# Patient Record
Sex: FEMALE | Race: ASIAN
[De-identification: names, ages, dates, MRNs, and addresses within clinical notes are randomized per-mention and may not be internally consistent; named-entity substitution may affect disease eponyms.]

---

## 2020-05-22 ENCOUNTER — HOSPITAL ENCOUNTER (EMERGENCY)
Dept: HOSPITAL 7 - FB.ED | Age: 40
LOS: 1 days | Discharge: HOME | End: 2020-05-23
Payer: COMMERCIAL

## 2020-05-22 DIAGNOSIS — Z88.1: ICD-10-CM

## 2020-05-22 DIAGNOSIS — I10: Primary | ICD-10-CM

## 2020-05-22 PROCEDURE — 99283 EMERGENCY DEPT VISIT LOW MDM: CPT

## 2020-05-23 VITALS — SYSTOLIC BLOOD PRESSURE: 129 MMHG | DIASTOLIC BLOOD PRESSURE: 87 MMHG | HEART RATE: 85 BPM

## 2020-05-23 NOTE — EDM.PDOC
ED HPI GENERAL MEDICAL PROBLEM





- General


Chief Complaint: Headache


Stated Complaint: HIGH BLOOD PRESSURE


Time Seen by Provider: 05/22/20 23:40


Source of Information: Reports: Patient


History Limitations: Reports: No Limitations





- History of Present Illness


INITIAL COMMENTS - FREE TEXT/NARRATIVE: 





headache frontal since this am , took tylenol, did not improve,. Took BP and 

noted it was elevated to 150/90 


recheck at 6pm was still elevated 


on arrival her , noted to be elevated still


no chest pain , no palpitations, no sob 


has family history of HTN


Onset: Today


Duration: Getting Worse


Location: Reports: Head


Improves with: Reports: None


Worsens with: Reports: None


Context: Reports: Activity


Associated Symptoms: Reports: Headaches





- Related Data


 Allergies











Allergy/AdvReac Type Severity Reaction Status Date / Time


 


amoxicillin Allergy  Hives Verified 05/23/20 00:41











Home Meds: 


 Home Meds





.Vitamin C 1 tab PO ASDIRECTED 05/23/20 [History]











Past Medical History





- Past Health History


Medical/Surgical History: Denies Medical/Surgical History





Social & Family History





- Living Situation & Occupation


Living situation: Reports: Single


Occupation: Employed





ED ROS GENERAL





- Review of Systems


Review Of Systems: See Below


Constitutional: Reports: No Symptoms


HEENT: Reports: No Symptoms


Respiratory: Reports: No Symptoms


Cardiovascular: Reports: No Symptoms


Endocrine: Reports: No Symptoms


GI/Abdominal: Reports: No Symptoms


Musculoskeletal: Reports: No Symptoms


Skin: Reports: No Symptoms





ED EXAM, GENERAL





- Physical Exam


Exam: See Below


Exam Limited By: No Limitations


General Appearance: Alert, WD/WN, No Apparent Distress


Eye Exam: Bilateral Eye: EOMI


Ears: Normal External Exam


Nose: Normal Mucosa


Throat/Mouth: Normal Oropharynx


Head: Atraumatic, Normocephalic


Neck: Supple, Non-Tender


Respiratory/Chest: No Respiratory Distress, Lungs Clear, Normal Breath Sounds


Cardiovascular: Regular Rate, Rhythm


GI/Abdominal: Soft, Non-Tender


 (Female) Exam: Deferred


Extremities: Normal Inspection, Normal Range of Motion


Neurological: Alert, Oriented, CN II-XII Intact


Psychiatric: Normal Affect, Normal Mood


Skin Exam: Warm





Course





- Vital Signs


Last Recorded V/S: 


 Last Vital Signs











Temp      


 


Pulse      


 


Resp      


 


BP  181/109 H  05/22/20 23:58


 


Pulse Ox      














- Orders/Labs/Meds


Orders: 


 Active Orders 24 hr











 Category Date Time Status


 


 Chlorthalidone Med  05/23/20 00:57 Once





 50 mg PO ONETIME ONE   








 Medication Orders





Chlorthalidone (Chlorthalidone)  50 mg PO ONETIME ONE


   Stop: 05/23/20 00:58








Meds: 


Medications











Generic Name Dose Route Start Last Admin





  Trade Name Willyq  PRN Reason Stop Dose Admin


 


Chlorthalidone  50 mg  05/23/20 00:57  





  Chlorthalidone  PO  05/23/20 00:58  





  ONETIME ONE   





     





     





     





     














Discontinued Medications














Generic Name Dose Route Start Last Admin





  Trade Name Freq  PRN Reason Stop Dose Admin


 


Clonidine HCl  0.2 mg  05/22/20 23:55  05/22/20 23:58





  Catapres  PO  05/22/20 23:56  0.2 mg





  ONETIME ONE   Administration





     





     





     





     














- Re-Assessments/Exams


Free Text/Narrative Re-Assessment/Exam: 





05/23/20 00:31


pt given clonidine and monitored 


will start pt on HCTZ


05/23/20 00:57








Departure





- Departure


Time of Disposition: 01:00


Disposition: Home, Self-Care 01


Condition: Fair


Clinical Impression: 


 Hypertension





Instructions:  How to Take Your Blood Pressure, Easy-to-Read, DASH Eating Plan, 

Hypertension, Adult, Easy-to-Read, Intracerebral Hemorrhage


Referrals: 


PCP,Unknown [Primary Care Provider] - 


Forms:  ED Department Discharge





Sepsis Event Note





- Focused Exam


Vital Signs: 


 Vital Signs











  BP


 


 05/22/20 23:58  181/109 H











Date Exam was Performed: 05/23/20


Time Exam was Performed: 01:00





- My Orders


Last 24 Hours: 


My Active Orders





05/23/20 00:57


Chlorthalidone   50 mg PO ONETIME ONE 














- Assessment/Plan


Last 24 Hours: 


My Active Orders





05/23/20 00:57


Chlorthalidone   50 mg PO ONETIME ONE